# Patient Record
Sex: FEMALE | Race: WHITE
[De-identification: names, ages, dates, MRNs, and addresses within clinical notes are randomized per-mention and may not be internally consistent; named-entity substitution may affect disease eponyms.]

---

## 2017-12-15 ENCOUNTER — HOSPITAL ENCOUNTER (EMERGENCY)
Dept: HOSPITAL 25 - ED | Age: 27
Discharge: HOME | End: 2017-12-15
Payer: COMMERCIAL

## 2017-12-15 VITALS — DIASTOLIC BLOOD PRESSURE: 74 MMHG | SYSTOLIC BLOOD PRESSURE: 122 MMHG

## 2017-12-15 DIAGNOSIS — M54.2: Primary | ICD-10-CM

## 2017-12-15 PROCEDURE — 99282 EMERGENCY DEPT VISIT SF MDM: CPT

## 2017-12-15 PROCEDURE — 96372 THER/PROPH/DIAG INJ SC/IM: CPT

## 2017-12-15 NOTE — ED
Neck Pain





- HPI Summary


HPI Summary: 


26F presents with neck pain for a day. She states she had a GI bug on Wed.  She 

states that has resolved but she woke up today with stiffness on the left side 

of her neck.  She states she is not able to turn her neck completely to the 

left.  She denies any fever, headache, or photophobia.  She states she has 

sharp pain down left arm that feels like pinched nerve.  She took ibuprofen 

this morning without relief.  She states she has never had this before.  no 

numbness or tingling.  no weakness.   








- History of Current Complaint


Chief Complaint: EDNeckComplaint


Stated Complaint: NECK PAIN


Time Seen by Provider: 12/15/17 17:41


Pain Intensity: 7





- Allergies/Home Medications


Allergies/Adverse Reactions: 


 Allergies











Allergy/AdvReac Type Severity Reaction Status Date / Time


 


coconut Allergy  Blisters Uncoded 12/15/17 16:38














PMH/Surg Hx/FS Hx/Imm Hx


Endocrine/Hematology History: 


   Denies: Hx Anticoagulant Therapy


Cardiovascular History: 


   Denies: Hx Hypertension


Infectious Disease History: No


Infectious Disease History: 


   Denies: Traveled Outside the [place] in Last 30 Days





- Family History


Known Family History: 


   Negative: Hypertension





- Social History


Alcohol Use: Occasionally


Substance Use Type: Reports: None


Smoking Status (MU): Never Smoked Tobacco





Review of Systems


Negative: Fever


Negative: Chest Pain


Negative: Shortness Of Breath


Positive: Myalgia - neck tenderness left


All Other Systems Reviewed And Are Negative: Yes





Physical Exam


Triage Information Reviewed: Yes


Vital Signs On Initial Exam: 


 Initial Vitals











Temp Pulse Resp BP Pulse Ox


 


 98.3 F   71   18   138/90   98 


 


 12/15/17 16:14  12/15/17 16:14  12/15/17 16:14  12/15/17 16:14  12/15/17 16:14











Vital Signs Reviewed: Yes


Appearance: Positive: Well-Appearing


Skin: Positive: Warm, Dry


Head/Face: Positive: Normal Head/Face Inspection


Eyes: Positive: Normal, EOMI, RIVAS, Conjunctiva Clear


ENT: Positive: Normal ENT inspection, Pharynx normal, TMs normal


Respiratory/Lung Sounds: Positive: Clear to Auscultation, Breath Sounds Present


Cardiovascular: Positive: Normal, RRR


Musculoskeletal: Positive: Limited @ - neck to left side of neck, Other - good 

pulses, capillary refill<2 secs, tenderness left side of neck, no midline 

tenderness neck


Neurological: Positive: Normal


Psychiatric: Positive: Normal





- Laura Coma Scale


Coma Scale Total: 15





Diagnostics





- Vital Signs


 Vital Signs











  Temp Pulse Resp BP Pulse Ox


 


 12/15/17 16:14  98.3 F  71  18  138/90  98














- Laboratory


Lab Statement: Any lab studies that have been ordered have been reviewed, and 

results considered in the medical decision making process.





Neck Course/Dx





- Course


Course Of Treatment: 26F presents with neck pain for a day. She states she had 

a GI bug on Wed.  She states that has resolved but she woke up today with 

stiffness on the left side of her neck.  She states she is not able to turn her 

neck completely to the left.  She denies any fever, headache, or photophobia.  

She states she has sharp pain down left arm that feels like pinched nerve.  She 

took ibuprofen this morning without relief.  She states she has never had this 

before.  no numbness or tingling.  no weakness.  on exam no midline tenderness, 

tender over left side of neck, limited ROM of neck to left, good  strength, 

biceps intact. will treat with ibuprofen and flexeril and heat as has a slight 

torticollis. patient understand and agrees with plan





- Diagnoses


Differential Dx/HQI/PQRI: Positive: Sprain, Strain, Torticollis


Provider Diagnoses: 


 Neck pain








Discharge





- Discharge Plan


Condition: Good


Disposition: HOME


Prescriptions: 


Cyclobenzaprine TAB* [Flexeril 10 MG TAB*] 10 mg PO TID PRN #15 tab


 PRN Reason: Pain


Patient Education Materials:  Neck Pain (ED)


Referrals: 


[facility] [place] [facility] [Outside]


Additional Instructions: 


Take muscle relaxers three times a day 


Use ibuprofen or Tylenol for pain every 6 hours


heat area, move as much as possible 


Follow up with primary within 5 days


Return to ED if develop any  new or worsening symptoms

## 2020-01-15 ENCOUNTER — HOSPITAL ENCOUNTER (EMERGENCY)
Dept: HOSPITAL 25 - UCEAST | Age: 30
Discharge: HOME | End: 2020-01-15
Payer: COMMERCIAL

## 2020-01-15 VITALS — DIASTOLIC BLOOD PRESSURE: 75 MMHG | SYSTOLIC BLOOD PRESSURE: 118 MMHG

## 2020-01-15 DIAGNOSIS — F32.9: ICD-10-CM

## 2020-01-15 DIAGNOSIS — Z91.018: ICD-10-CM

## 2020-01-15 DIAGNOSIS — R05: ICD-10-CM

## 2020-01-15 DIAGNOSIS — F41.9: ICD-10-CM

## 2020-01-15 DIAGNOSIS — J02.9: Primary | ICD-10-CM

## 2020-01-15 DIAGNOSIS — Z79.899: ICD-10-CM

## 2020-01-15 PROCEDURE — G0463 HOSPITAL OUTPT CLINIC VISIT: HCPCS

## 2020-01-15 PROCEDURE — 99212 OFFICE O/P EST SF 10 MIN: CPT

## 2020-01-15 NOTE — UC
Throat Pain/Nasal Jace HPI





- HPI Summary


HPI Summary: 





28 yo female presents with sore throat. She tells me that about 10 days ago she 

developed a sore throat after being exposed to strep. Saw her PCP and strep 

test was negative. She has been taking OTC medication with no relief. Symptoms 

have persisted. Over the last 2 days has had an intermittently productive cough 

with green/yellow sputum. She denies fever, chills, sinus symptoms, SOB, chest 

pain, rash. She did get a flu shot this year. Does not smoke. 





- History of Current Complaint


Chief Complaint: UCRespiratory


Stated Complaint: COUGH SORE THROAT


Time Seen by Provider: 01/15/20 13:28


Hx Obtained From: Patient


Hx Last Menstrual Period: three weeks ago


Onset/Duration: Gradual Onset


Severity: Mild


Pain Intensity: 2


Pain Scale Used: 0-10 Numeric





- Allergies/Home Medications


Allergies/Adverse Reactions: 


 Allergies











Allergy/AdvReac Type Severity Reaction Status Date / Time


 


coconut Allergy  Blisters Uncoded 01/15/20 13:26











Home Medications: 


 Home Medications





Acetaminophen 2 tab PO ONCE PRN 01/15/20 [History Confirmed 01/15/20]


Fluticasone NASAL SPRAY 50MCG* [Flonase NASAL SPRAY 50MCG*] 1 spray INH BID PRN 

01/15/20 [History Confirmed 01/15/20]


Sertraline* [Zoloft*] 1 tab PO DAILY 01/15/20 [History Confirmed 01/15/20]











PMH/Surg Hx/FS Hx/Imm Hx


Psychological History: Anxiety, Depression


Other History Of: 


   Negative For: Anticoagulant Therapy





- Surgical History


Surgical History: None





- Family History


Known Family History: Positive: None


   Negative: Hypertension





- Social History


Occupation: Employed Full-time


Lives: With Family


Alcohol Use: Occasionally


Substance Use Type: None


Smoking Status (MU): Never Smoked Tobacco





Review of Systems


All Other Systems Reviewed And Are Negative: No


Constitutional: Positive: Negative


Skin: Positive: Negative


Eyes: Positive: Negative


ENT: Positive: Sore Throat


Respiratory: Positive: Cough


Cardiovascular: Positive: Negative


Gastrointestinal: Positive: Negative


Neurovascular: Positive: Negative


Neurological: Positive: Negative


Psychological: Positive: Negative





Physical Exam





- Summary


Physical Exam Summary: 





GENERAL: NAD. WDWN. No pain distress.


SKIN: No rashes, sores, lesions, or open wounds.


HEENT:


            Head: AT/NC


            Eyes: EOM intact. Conjunctiva clear without inflammation or 

discharge.


            Ears: Hearing grossly normal. TMs intact, no bulging, erythema, or 

edema. 


            Nose: Nasal mucosa pink and moist. NTTP maxillary and frontal 

sinus. 


            Throat: Posterior oropharynx with mild erythema. No exudates or 

tonsillar enlargement.  Uvula midline.


NECK: Supple. Mild tonsillar LAD ttp. 


CHEST:  CTAB. No r/r/w. No accessory muscle use. Breathing comfortably and in 

no distress.


CV:  RRR. Pulses intact. Cap refill <2seconds


NEURO: Alert. 


PSYCH: Age appropriate behavior.


Triage Information Reviewed: Yes


Vital Signs: 


 Initial Vital Signs











Temp  98.6 F   01/15/20 13:21


 


Pulse  66   01/15/20 13:21


 


Resp  18   01/15/20 13:21


 


BP  118/75   01/15/20 13:21


 


Pulse Ox  100   01/15/20 13:21











Vital Signs Reviewed: Yes





Throat Pain/Nasal Course/Dx





- Course


Course Of Treatment: 





Given length of symptoms and worsening symptoms will rx for anbx at this time.





- Differential Dx/Diagnosis


Provider Diagnosis: 


 Pharyngitis








Discharge ED





- Sign-Out/Discharge


Documenting (check all that apply): Patient Departure


All imaging exams completed and their final reports reviewed: No Studies





- Discharge Plan


Condition: Stable


Disposition: HOME


Prescriptions: 


Azithromycin TAB* [Zithromax TAB (Z-ARCELIA) 250 mg #6 tabs] 2 tab PO .TODAY, THEN 

1 DAILY #1 arcelia


Patient Education Materials:  Pharyngitis (ED)


Forms:  *Work Release


Referrals: 


Jennifer Ann MD [Primary Care Provider] - 


Additional Instructions: 


If you develop a fever, shortness of breath, chest pain, new or worsening 

symptoms - please call your PCP or go to the ED immediately.


 








- Billing Disposition and Condition


Condition: STABLE


Disposition: Home





- Attestation Statements


Provider Attestation: 





This patient was not seen by me. 


I was available for consult.


Chart reviewed.





ANA MARIA